# Patient Record
Sex: FEMALE | Race: WHITE
[De-identification: names, ages, dates, MRNs, and addresses within clinical notes are randomized per-mention and may not be internally consistent; named-entity substitution may affect disease eponyms.]

---

## 2019-08-22 ENCOUNTER — HOSPITAL ENCOUNTER (OUTPATIENT)
Dept: HOSPITAL 96 - M.ERS | Age: 69
Setting detail: OBSERVATION
Discharge: HOME | End: 2019-08-22
Attending: INTERNAL MEDICINE | Admitting: INTERNAL MEDICINE
Payer: COMMERCIAL

## 2019-08-22 VITALS — DIASTOLIC BLOOD PRESSURE: 59 MMHG | SYSTOLIC BLOOD PRESSURE: 144 MMHG

## 2019-08-22 VITALS — DIASTOLIC BLOOD PRESSURE: 43 MMHG | SYSTOLIC BLOOD PRESSURE: 104 MMHG

## 2019-08-22 VITALS — SYSTOLIC BLOOD PRESSURE: 117 MMHG | DIASTOLIC BLOOD PRESSURE: 59 MMHG

## 2019-08-22 VITALS — HEIGHT: 64.02 IN | WEIGHT: 146 LBS | BODY MASS INDEX: 24.92 KG/M2

## 2019-08-22 VITALS — DIASTOLIC BLOOD PRESSURE: 61 MMHG | SYSTOLIC BLOOD PRESSURE: 119 MMHG

## 2019-08-22 VITALS — SYSTOLIC BLOOD PRESSURE: 164 MMHG | DIASTOLIC BLOOD PRESSURE: 71 MMHG

## 2019-08-22 VITALS — SYSTOLIC BLOOD PRESSURE: 119 MMHG | DIASTOLIC BLOOD PRESSURE: 61 MMHG

## 2019-08-22 DIAGNOSIS — I25.10: ICD-10-CM

## 2019-08-22 DIAGNOSIS — Z79.899: ICD-10-CM

## 2019-08-22 DIAGNOSIS — I42.8: ICD-10-CM

## 2019-08-22 DIAGNOSIS — Z87.891: ICD-10-CM

## 2019-08-22 DIAGNOSIS — I10: ICD-10-CM

## 2019-08-22 DIAGNOSIS — J44.9: ICD-10-CM

## 2019-08-22 DIAGNOSIS — E78.5: ICD-10-CM

## 2019-08-22 DIAGNOSIS — R07.89: Primary | ICD-10-CM

## 2019-08-22 DIAGNOSIS — Z79.02: ICD-10-CM

## 2019-08-22 LAB
ABSOLUTE BASOPHILS: 0.1 THOU/UL (ref 0–0.2)
ABSOLUTE EOSINOPHILS: 0.3 THOU/UL (ref 0–0.7)
ABSOLUTE MONOCYTES: 0.8 THOU/UL (ref 0–1.2)
ALBUMIN SERPL-MCNC: 3.2 G/DL (ref 3.4–5)
ALP SERPL-CCNC: 124 U/L (ref 46–116)
ALT SERPL-CCNC: 20 U/L (ref 30–65)
ANION GAP SERPL CALC-SCNC: 6 MMOL/L (ref 7–16)
AST SERPL-CCNC: 24 U/L (ref 15–37)
BASOPHILS NFR BLD AUTO: 0.7 %
BILIRUB SERPL-MCNC: 0.3 MG/DL
BUN SERPL-MCNC: 13 MG/DL (ref 7–18)
CALCIUM SERPL-MCNC: 9.4 MG/DL (ref 8.5–10.1)
CHLORIDE SERPL-SCNC: 106 MMOL/L (ref 98–107)
CHOLEST SERPL-MCNC: 143 MG/DL (ref ?–200)
CO2 SERPL-SCNC: 30 MMOL/L (ref 21–32)
CREAT SERPL-MCNC: 0.7 MG/DL (ref 0.6–1.3)
EOSINOPHIL NFR BLD: 3.6 %
GLUCOSE SERPL-MCNC: 104 MG/DL (ref 70–99)
GRANULOCYTES NFR BLD MANUAL: 61.6 %
HCT VFR BLD CALC: 38.6 % (ref 37–47)
HDLC SERPL-MCNC: 46 MG/DL (ref 40–?)
HGB BLD-MCNC: 12.6 GM/DL (ref 12–15)
INR PPP: 1
LDLC SERPL-MCNC: 55 MG/DL (ref ?–100)
LIPASE: 146 U/L (ref 73–393)
LYMPHOCYTES # BLD: 2 THOU/UL (ref 0.8–5.3)
LYMPHOCYTES NFR BLD AUTO: 24.6 %
MCH RBC QN AUTO: 30.8 PG (ref 26–34)
MCHC RBC AUTO-ENTMCNC: 32.6 G/DL (ref 28–37)
MCV RBC: 94.4 FL (ref 80–100)
MONOCYTES NFR BLD: 9.5 %
MPV: 7.6 FL. (ref 7.2–11.1)
NEUTROPHILS # BLD: 5.1 THOU/UL (ref 1.6–8.1)
NT-PRO BRAIN NAT PEPTIDE: 285 PG/ML (ref ?–300)
NUCLEATED RBCS: 0 /100WBC
PLATELET COUNT*: 324 THOU/UL (ref 150–400)
POTASSIUM SERPL-SCNC: 4 MMOL/L (ref 3.5–5.1)
PROT SERPL-MCNC: 6.2 G/DL (ref 6.4–8.2)
PROTHROMBIN TIME: 10.1 SECONDS (ref 9.2–11.5)
RBC # BLD AUTO: 4.09 MIL/UL (ref 4.2–5)
RDW-CV: 14.4 % (ref 10.5–14.5)
SODIUM SERPL-SCNC: 142 MMOL/L (ref 136–145)
TC:HDL: 3.1 RATIO
TRIGL SERPL-MCNC: 210 MG/DL (ref ?–150)
TROPONIN-I LEVEL: <0.06 NG/ML (ref ?–0.06)
VLDLC SERPL CALC-MCNC: 42 MG/DL (ref ?–40)
WBC # BLD AUTO: 8.3 THOU/UL (ref 4–11)

## 2019-08-22 NOTE — EKG
[place] [address]
Phone:  [phone]                     ELECTROCARDIOGRAM REPORT      
_______________________________________________________________________________
 
Name:       GARRET SPRAGUE                  Room:           [address]    ADM IN  
.R.#:  [id number]      Account #:      [id number]  
Admission:  19     Attend Phys:    Aleks Castorena MD 
Discharge:               Date of Birth:  [birth year]  
         Report #: [id number]
    [id number]
_______________________________________________________________________________
THIS REPORT FOR:  //name//                      
 
                         [facility] ED
                                       
Test Date:    2019               Test Time:    01:09:34
Pat Name:     GARRET CELESTINE              Department:   
Patient ID:   [id number]            Room:         [facility]
Gender:       F                        Technician:   [place]
:          1950               Requested By: Kat Caballero
Order Number: [id number] MD:   Chris Ritter
                                 Measurements
Intervals                              Axis          
Rate:         48                       P:            65
SC:           146                      QRS:          7
QRSD:         163                      T:            130
QT:           525                                    
QTc:          470                                    
                           Interpretive Statements
Sinus bradycardia
Left bundle branch block
No previous ECG available for comparison
 
Electronically Signed On 2019 11:07:40 CDT by Chris Ritter
[email or web address]
 
 
 
 
 
 
 
 
 
 
 
 
 
 
 
 
 
 
 
  <ELECTRONICALLY SIGNED>
                                           By: Chris Ritter MD, [facility]      
  19     1107
D: 19 0109   _____________________________________
T: 19 0109   Chris Ritter MD, FACC        /EPI

## 2019-08-22 NOTE — 2DMMODE
[place] [address]
Phone:  [phone] 2 D/M-MODE ECHOCARDIOGRAM     
_______________________________________________________________________________
 
Name:         GARRET SPRAGUE                 Room:          [address]
MALISSA#:    [id number]     Account #:     [id number]  
Admission:    19    Attend Phys:   Aleks Castorena, 
Discharge:                Date of Birth: [birth year]  
Date of Service: 19 1244  Report #:      [id number]
        [id number]
_______________________________________________________________________________
THIS REPORT FOR:  //name//                      
 
 
--------------- APPROVED REPORT --------------
 
 
Study performed:  2019 10:42:26
 
EXAM: Comprehensive 2D, Doppler, and color-flow 
Echocardiogram 
Patient Location: In-Patient   
Room #:  213     Status:  routine
 
      BSA:         1.71
HR: 48 bpm BP:          117/59 mmHg 
Rhythm: NSR     
 
Other Information 
Study Quality: Good
 
Indications
Chest Pain
 
2D Dimensions
IVSd:  12.54 (7-11mm) LVOT Diam:  24.22 (18-24mm) 
LVDd:  54.68 mm  
PWd:  10.36 (7-11mm) Ascending Ao:  34.68 (22-36mm)
LVDs:  46.71 (25-40mm) 
Aortic Root:  42.56 mm 
 
Volumes
Left Atrial Volume (Systole) 
    LA ESV Index:  40.70 mL/m2
 
Aortic Valve
AoV Peak Tony.:  1.32 m/s 
AO Peak Gr.:  6.97 mmHg  LVOT Max P.42 mmHg
AO Mean Gr.:  3.62 mmHg  LVOT Mean P.28 mmHg
    LVOT Max V:  1.16 m/s
AO V2 VTI:  28.82 cm  LVOT Mean V:  0.68 m/s
THANG (VTI):  3.85 cm2  LVOT V1 VTI:  24.10 cm
 
Mitral Valve
    E/A Ratio:  1.05
    MV Decel. Time:  171.97 ms
MV E Max Tony.:  0.71 m/s 
 
 
[place] [address]
Phone:  [phone]                     2 D/M-MODE ECHOCARDIOGRAM     
_______________________________________________________________________________
 
Name:         CELESTINEGARRET PIEDAD                 Room:          [address]
M.R.#:    [id number]     Account #:     [id number]  
Admission:    19    Attend Phys:   Aleks Castorena, 
Discharge:                Date of Birth: [birth year]  
Date of Service: 19 1244  Report #:      [id number]
        [id number]
_______________________________________________________________________________
MV PHT:  49.87 ms  
MVA (PHT):  4.41 cm2  
 
TDI
E/Lateral E':  7.10 E/Medial E':  10.14
   Medial E' Tony.:  0.07 m/s
   Lateral E' Tony.:  0.10 m/s
 
Pulmonary Valve
PV Peak Tony.:  0.76 m/s PV Peak Gr.:  2.30 mmHg
 
Tricuspid Valve
    RAP Estimate:  5.00 mmHg
TR Peak Gr.:  21.12 mmHg RVSP:  26.00 mmHg
    PA Pressure:  26.00 mmHg
 
Left Ventricle
The left ventricle is normal size. There is global hypokinesis of the 
left ventricle. There is normal left ventricular wall thickness. Left 
ventricular systolic function is moderately decreased. LVEF is 
30-35%. The left ventricular diastolic function is normal.
 
Right Ventricle
The right ventricle is normal size. The right ventricular systolic 
function is normal.
 
Atria
Left atrium is mildly dilated. The right atrium size is 
normal.
 
Aortic Valve
The aortic valve is normal in structure. Trace aortic regurgitation. 
There is no aortic valvular stenosis.
 
Mitral Valve
The mitral valve is normal in structure. Mild mitral regurgitation. 
No evidence of mitral valve stenosis.
 
Tricuspid Valve
The tricuspid valve is normal in structure. Mild tricuspid 
regurgitation. estimate pa pressure 30 mm Hg
 
Pulmonic Valve
The pulmonary valve is normal in structure. Trace pulmonic 
regurgitation.
 
 
 
[place] [address]
Phone:  [phone] 2 D/M-MODE ECHOCARDIOGRAM     
_______________________________________________________________________________
 
Name:         GARRET SPRAGUE                 Room:          [address].#:    [id number]     Account #:     [id number]  
Admission:    19    Attend Phys:   Aleks Castorena, 
Discharge:                Date of Birth: [birth year]  
Date of Service: 19 1244  Report #:      [id number]
        [id number]
_______________________________________________________________________________
Great Vessels
Aortic root is dilated. IVC is normal in size and collapses >50% 
with inspiration.
 
Pericardium
There is no pericardial effusion.
 
<Conclusion>
LVEF is 30-35%.
Left atrium is mildly dilated.
Mild mitral regurgitation.
 
 
 
 
 
 
 
 
 
 
 
 
 
 
 
 
 
 
 
 
 
 
 
 
 
 
 
 
 
 
 
 
 
  <ELECTRONICALLY SIGNED>
                                           By: Chris Ritter MD, FACC      
  19     1244
D: 19 1244   _____________________________________
T: 19 1244   Chris Ritter MD, FACC        /INF

## 2019-08-23 NOTE — CARDNUC
[place] [address]
Phone:  [phone]                     CARDIAC NUCLEAR IMAGING REPORT
_______________________________________________________________________________
 
Name:         CELESTINEGARRET PIEDAD                 Room:          [address]
M.RMary#:    [id number]     Account #:     [id number]  
Admission:    08/22/19    Attend Phys:   Aleks Castorena, 
Discharge:    08/22/19    Date of Birth: [birth year]  
Date of Service: 08/23/19 0854  Report #:      [id number]
        [id number] 
_______________________________________________________________________________
THIS REPORT FOR:  //name//                      
 
 
--------------- APPROVED REPORT --------------
 
 
Study performed:  08/22/2019 15:36:18
 
Exam: Nuclear Stress Test
Indication: Chest pain
Patient Location: In-Patient
Room #: 213
Stress Tech: Sarina Parra
Stress Nurse: Rosita Kessler RN
NM Tech:JOSEPHINE Patrick
 
Ht: 5 ft 2 in  Wt: 146 lbs  BSA:  1.67 m2
    BMI:  26.70
 
Medical History
Medical History: hyperlipidemia, hypertension, chf, copd, 
crdiomyopathy
Medications: carvedilol, lisinopril, statin
Allergies: nkda
Cardiac Risk Factors: age, hyperlipidemia, hypertension, past hx 
tobacco
Exercise History: Indeterminate
Meds Held (24 hrs): carvedilol
 
Stress Test Details
Stress Test:  Pharmacologic stress testing performed using 0.4 mg of 
regadenoson per 5 mL given IV over 10 seconds.      
  Reason for pharmacologic stress test: 
LBBB.      
HR           
Resting HR:            55 bpm   Max Heart Rate (APMHR): 150 bpm  
Max HR Achieved:  110 bpm   Target HR (85% APMHR): 127 bpm  
% of APMHR:         73         
Recovery HR:            91 bpm        
HR response to stress: Normal HR response to stress      
 
BP           
Resting BP:  144/78 mmHg        
Max BP:       121/77 mmHg        
 
BP response to stress: Normal blood pressure response to 
 
 
[place] [address]
Phone:  [phone]                     CARDIAC NUCLEAR IMAGING REPORT
_______________________________________________________________________________
 
Name:         CELESTINEGARRET PIEDAD                 [address]          [address]    [id number]     Account #:     [id number]  
Admission:    08/22/19    Attend Phys:   Aleks Castorena, 
Discharge:    08/22/19    Date of Birth: [birth year]  
Date of Service: 08/23/19 0854  Report #:      [id number]
        [id number] 
_______________________________________________________________________________
stress.      
ECG           
Resting ECG:  Sinus Rhythm, LBBB       
Stress ECG:     Sinus Rhythm, LBBB       
ST Change: Horizontal ST depression       
Maximum ST Deviation: 0.5 mm        
Arrhythmia:    VPC's         
Recovery ECG: Sinus Rhythm, LBBB       
Recovery ST Change: None        
Recovery ST Deviation: 0 mm        
Recovery Arrhythmia: VPCs        
 
Clinical
Reason for Termination: Completed protocol
Stress Symptoms: None
Exercise duration: 0 min  sec
Exercise capacity: 1 METs
 
Stress ECG Conclusion
Normal hemodynamic response to pharmacologic stress.
Non-diagnostic exercise ekg due to failure to attain target HR. 
 
NM EXAM: Myocardial Perfusion REST/STRESS
Imaging Protocol: Rest Tc-99m/Stress Tc-99m 1 day
 
Resting Data
Rest SPECT myocardial perfusion imaging was performed in supine 
position 30 minutes following the intravenous injection of 11.0 mCi 
of Tc-99m Sestamibi.
Time of rest injection: 1355     Date: 08/22/2019
The images were gated to evaluate regional wall motion and calculate 
left ventricular ejection fraction. 
Administration Route: IV
Administration Site: Left AC
 
Pharmacologic Stress
Pharmacologic stress test was performed by injecting Regadenoson 0.4 
mg IV push followed by the intravenous injection of 34.0 mCi of 
Tc-99m Sestamibi.
Time of stress injection: 1540     Date: 08/22/2019
Administration Route: IV
Administration Site: Left AC
Gated Stress SPECT was performed 40 minutes after stress 
injection.
The images were gated to evaluate regional wall motion and calculate 
left ventricular ejection fraction. 
 
 
[place] [address]
Phone:  [phone]                     CARDIAC NUCLEAR IMAGING REPORT
_______________________________________________________________________________
 
Name:         GARRET SPRAGUE                 [address]          [address]    [id number]     Account #:     [id number]  
Admission:    08/22/19    Attend Phys:   Aleks Castorena, 
Discharge:    08/22/19    Date of Birth: [birth year]  
Date of Service: 08/23/19 0854  Report #:      [id number]
        [id number] 
_______________________________________________________________________________
Prone imaging was performed.
 
Study Quality
Study: Good
Artifact: No artifact 
Lung Uptake: Normal
 
Study Data
At rest, the left ventricular ejection fraction was 36%..   
Post stress, the left ventricular ejection was 32%..   
TID = 0.99.       
 
Perfusion
The resting study demonstrates a moderate in size moderate intensity 
apical defect. There is also a moderate in size mild intensity septal 
defect present. The defect could be due to the presence of left 
bundle branch block. The stress images are unchanged from the rest 
images. Prone images were obtained and were unchanged from the rest 
images. Therefore no reversible defects seen and there is no evidence 
of myocardial ischemia. The fixed defects seen could be due to prior 
myocardial infarction or to localized scar from prior viral 
infection.
Images were reviewed using [employer].
 
Wall Motion
There is moderately severe global hypokinesis. There is also very 
severe apical hypokinesis.
 
Nuclear Conclusion
ECG Findings: non-diagnostic
Clinical Findings: non-ischemic
Nuclear Findings: negative for ischemia
Exercise Capacity: not assessed
Left Ventricular Function: abnormal
Risk Study: high
The Lexiscan Cardiolite stress test is negative for ischemia. There 
is a fixed apical defect present. This defect could be due to prior 
myocardial infarction or to localized scar from prior viral 
infection. Overall this is a high risk study due to the severe left 
systolic dysfunction. 
 
<Conclusion>
 
 
[place] [address]
Phone:  [phone]                     CARDIAC NUCLEAR IMAGING REPORT
_______________________________________________________________________________
 
Name:         CELESTINEGARRET PIEDAD                 Room:          [address] Juan SHANNON#:    [id number]     Account #:     [id number]  
Admission:    08/22/19    Attend Phys:   Aleks Castorena, 
Discharge:    08/22/19    Date of Birth: [birth year]  
Date of Service: 08/23/19 0854  Report #:      [id number]
        [id number] 
_______________________________________________________________________________
Normal hemodynamic response to pharmacologic stress.
Non-diagnostic exercise ekg due to failure to attain target HR.
 
 
 
 
 
 
 
 
 
 
 
 
 
 
 
 
 
 
 
 
 
 
 
 
 
 
 
 
 
 
 
 
 
 
 
 
 
 
 
 
 
 
  <ELECTRONICALLY SIGNED>
                                           By: CANDELARIO Rooney MD, FACC    
  08/23/19 0854
D: 08/23/19 0854   _____________________________________
T: 08/23/19 0854   CANDELARIO Rooney MD, FACC      /INF